# Patient Record
Sex: MALE | Race: BLACK OR AFRICAN AMERICAN
[De-identification: names, ages, dates, MRNs, and addresses within clinical notes are randomized per-mention and may not be internally consistent; named-entity substitution may affect disease eponyms.]

---
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
## 2020-10-09 ENCOUNTER — HOSPITAL ENCOUNTER (EMERGENCY)
Dept: HOSPITAL 61 - ER | Age: 45
Discharge: HOME | End: 2020-10-09
Payer: COMMERCIAL

## 2020-10-09 VITALS — BODY MASS INDEX: 30.97 KG/M2 | HEIGHT: 73 IN | WEIGHT: 233.69 LBS

## 2020-10-09 VITALS
SYSTOLIC BLOOD PRESSURE: 124 MMHG | DIASTOLIC BLOOD PRESSURE: 86 MMHG | DIASTOLIC BLOOD PRESSURE: 86 MMHG | SYSTOLIC BLOOD PRESSURE: 124 MMHG

## 2020-10-09 DIAGNOSIS — Z98.890: ICD-10-CM

## 2020-10-09 DIAGNOSIS — S39.012A: Primary | ICD-10-CM
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
## 2020-10-09 DIAGNOSIS — Z87.891: ICD-10-CM
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
## 2020-10-09 DIAGNOSIS — I10: ICD-10-CM

## 2020-10-09 DIAGNOSIS — E11.9: ICD-10-CM
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
## 2020-10-09 PROCEDURE — 99283 EMERGENCY DEPT VISIT LOW MDM: CPT
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
## 2021-07-09 ENCOUNTER — HOSPITAL ENCOUNTER (OUTPATIENT)
Dept: HOSPITAL 61 - KCIC | Age: 46
End: 2021-07-09
Attending: FAMILY MEDICINE
Payer: COMMERCIAL
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
## 2021-07-09 DIAGNOSIS — S62.397A: Primary | ICD-10-CM
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
## 2021-07-09 PROCEDURE — 73130 X-RAY EXAM OF HAND: CPT
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
